# Patient Record
Sex: FEMALE | Race: WHITE | NOT HISPANIC OR LATINO | Employment: FULL TIME | URBAN - METROPOLITAN AREA
[De-identification: names, ages, dates, MRNs, and addresses within clinical notes are randomized per-mention and may not be internally consistent; named-entity substitution may affect disease eponyms.]

---

## 2017-06-26 RX ORDER — ESCITALOPRAM OXALATE 10 MG/1
TABLET ORAL
Qty: 90 TABLET | Refills: 0 | Status: SHIPPED | OUTPATIENT
Start: 2017-06-26 | End: 2017-10-03 | Stop reason: SDUPTHER

## 2017-06-26 NOTE — TELEPHONE ENCOUNTER
Attempted to contact pt regarding follow up needed. Left voicemail for pt to return call to clinic.

## 2017-06-26 NOTE — TELEPHONE ENCOUNTER
----- Message from Manju Jesus sent at 6/26/2017  2:42 PM CDT -----  Contact: self  Placed call to pod, patient miss call from your office please call back at [phone]

## 2017-06-26 NOTE — TELEPHONE ENCOUNTER
Notified pt of rx refill and follow up needed. Appointment booked. Time and date confirmed with pt.

## 2017-09-29 ENCOUNTER — PATIENT MESSAGE (OUTPATIENT)
Dept: FAMILY MEDICINE | Facility: CLINIC | Age: 47
End: 2017-09-29

## 2017-10-03 ENCOUNTER — OFFICE VISIT (OUTPATIENT)
Dept: FAMILY MEDICINE | Facility: CLINIC | Age: 47
End: 2017-10-03
Payer: COMMERCIAL

## 2017-10-03 VITALS
RESPIRATION RATE: 12 BRPM | HEIGHT: 61 IN | WEIGHT: 120.63 LBS | TEMPERATURE: 99 F | HEART RATE: 50 BPM | SYSTOLIC BLOOD PRESSURE: 100 MMHG | DIASTOLIC BLOOD PRESSURE: 60 MMHG | BODY MASS INDEX: 22.78 KG/M2

## 2017-10-03 DIAGNOSIS — F41.9 ANXIETY: Primary | ICD-10-CM

## 2017-10-03 DIAGNOSIS — F43.20 ADJUSTMENT DISORDER, UNSPECIFIED TYPE: ICD-10-CM

## 2017-10-03 PROCEDURE — 99214 OFFICE O/P EST MOD 30 MIN: CPT | Mod: S$GLB,,, | Performed by: NURSE PRACTITIONER

## 2017-10-03 RX ORDER — PROPRANOLOL HYDROCHLORIDE 80 MG/1
40 TABLET ORAL 2 TIMES DAILY
COMMUNITY
Start: 2017-10-02 | End: 2018-09-11 | Stop reason: SDUPTHER

## 2017-10-03 RX ORDER — FLUCONAZOLE 150 MG/1
TABLET ORAL
COMMUNITY
Start: 2017-09-25 | End: 2017-10-03 | Stop reason: ALTCHOICE

## 2017-10-03 RX ORDER — IBUPROFEN 800 MG/1
800 TABLET ORAL
COMMUNITY
Start: 2017-10-02 | End: 2018-06-11 | Stop reason: CLARIF

## 2017-10-03 RX ORDER — ALPRAZOLAM 1 MG/1
TABLET ORAL
Qty: 30 TABLET | Refills: 0 | Status: SHIPPED | OUTPATIENT
Start: 2017-10-03 | End: 2017-11-02 | Stop reason: SDUPTHER

## 2017-10-03 RX ORDER — CLINDAMYCIN PHOSPHATE 100 MG/5G
CREAM VAGINAL
COMMUNITY
Start: 2017-09-25 | End: 2017-10-03 | Stop reason: ALTCHOICE

## 2017-10-03 RX ORDER — ACETIC ACID AND OXYQUINOLINE SULFATE .009; .00025 G/G; G/G
JELLY VAGINAL
COMMUNITY
Start: 2017-09-12 | End: 2018-06-11 | Stop reason: CLARIF

## 2017-10-03 NOTE — PROGRESS NOTES
"Subjective:       Patient ID: Jane Carrero is a 47 y.o. female.    Chief Complaint: Anxiety    HPI Here for concerns regarding anxiety. She is currently going through a divorce after 20 years of marriage. She and her 3 children are moving into a new home. She states she is feeling overwhelmed at this time. She states most of the time she is handling things well, but will get overly anxious, panic feeling. She states when this happens she gets nervous, SOB, palpitations. She has taken xanax In the past when she was having these symptoms. She denies any prolonged use of Benzodiazepines. She does not have a record per the [employer] website for abuse. She is followed by Dr. Adams in neurology for her migraine headaches.  She denies any other concerns. See ROS.    The following portion of the patients history was reviewed and updated as appropriate: allergies, current medications, past medical and surgical history. Past social history and problem list reviewed. Family PMH and Past social history reviewed. Tobacco, Illicit drug use reviewed.     Review of Systems  Constitutional: No fatigue or fever    HENT: no sore throat or nasal congestion. No voice changes    Eyes: No vision changes, blurred vision  Skin: no rashes or lesions  Respiratory:   No SOB, Wheezing, cough  Cardiovascular:   No CP, Palpitations  Gastrointestinal:   No N/V/D. No abdominal pain, weight stable. Appetite good.   Genitourinary:   No dysuria, urgency or frequency. No change in bowels. No blood in stools.   Musculoskeletal:   No joint pain  No change in gait or coordination. .  Neurological:   No dizziness. No headaches  Hematological: No abnormal bruising or bleeding    Psychiatric/Behavioral Negative for suicidal ideas.  Denies feelings of depression. No thoughts of wanting to harm self or others. see HPI    Objective:     /60   Pulse (!) 50 Comment: manual  Temp 98.8 °F (37.1 °C) (Oral)   Resp 12   Ht 5' 1" (1.549 m)   Wt 54.7 kg (120 " lb 9.6 oz)   LMP 09/28/2017   BMI 22.79 kg/m²      Physical Exam     Constitutional: oriented to person, place, and time. well-developed and well-nourished.   HENT: nares patent. Throat clear.   Head: Normocephalic.   Eyes: Conjunctivae are normal. Pupils are equal, round, and reactive to light.   Neck: Normal range of motion. Neck supple. No tracheal deviation present. No thyromegaly present.   Cardiovascular: Normal rate, regular rhythm and normal heart sounds.    Pulmonary/Chest: Effort normal and breath sounds normal. No respiratory distress. No wheezes.   Abdominal: Soft. Bowel sounds are normal. No distension. There is no tenderness.   Musculoskeletal: Normal range of motion. Gait and coordination normal  Neurological: oriented to person, place, and time.   Skin: Skin is warm and dry. No rashes or lesions  Psychiatric: Normal mood and affect.Behavior is normal. Judgment and thought content normal. she does not present as a threat to herself or others.  Assessment:       1. Anxiety    2. Adjustment disorder, unspecified type        Plan:         Jane was seen today for anxiety.    Diagnoses and all orders for this visit:    Anxiety: will give one script for Xanax. Take only as needed. Do not take more than the prescribed amount.    Adjustment disorder, unspecified type    Other orders  -     alprazolam (XANAX) 1 MG tablet; 1/2 to 1 tablet twice daily as needed.  Do not take with ETOH, Sedatives or other narcotics. Do not take and drive due to potential for sedation. Do not take more than the prescribed amount.    Continue current medication  Take medications only as prescribed  Healthy diet, exercise  Adequate rest  Adequate hydration  Avoid allergens  Avoid excessive caffeine

## 2017-11-02 ENCOUNTER — PATIENT MESSAGE (OUTPATIENT)
Dept: FAMILY MEDICINE | Facility: CLINIC | Age: 47
End: 2017-11-02

## 2017-11-02 RX ORDER — ALPRAZOLAM 1 MG/1
TABLET ORAL
Qty: 30 TABLET | Refills: 0 | Status: CANCELLED | OUTPATIENT
Start: 2017-11-02

## 2017-11-06 NOTE — TELEPHONE ENCOUNTER
----- Message from Shannon Guerra sent at 11/6/2017  1:40 PM CST -----  Contact: [phone]  Patient is requesting a call back from the nurse, want to know was dr harris was going to write her a prescription for some medication she was asking for.  Please call the patient upon request at phone number [phone].

## 2017-11-06 NOTE — TELEPHONE ENCOUNTER
----- Message from Shannon Guerra sent at 11/6/2017  1:40 PM CST -----  Contact: [phone]  Patient is requesting a call back from the nurse, want to know was dr harris was going to write her a prescription for some medication she was asking for.  Please call the patient upon request at phone number [phone].

## 2017-11-07 RX ORDER — ALPRAZOLAM 1 MG/1
1 TABLET ORAL DAILY PRN
Qty: 30 TABLET | Refills: 0 | Status: SHIPPED | OUTPATIENT
Start: 2017-11-07 | End: 2018-01-10

## 2017-11-07 NOTE — TELEPHONE ENCOUNTER
Pt contacted regarding refill and an appointment has been schedule for future refills with Md, no further needs

## 2017-11-07 NOTE — TELEPHONE ENCOUNTER
----- Message from Graciela Gomez sent at 11/7/2017 12:32 PM CST -----  Contact: Patient  Jane, patient [phone] calling in regards to alprazolam (XANAX) 1 MG tablet. Patient is wanting a 1 month supply, she is having a lot of anxiety. Please advise. Thanks.    [employer] [id number] [place] [id number]  Phone: [phone] Fax: [phone]

## 2017-11-07 NOTE — TELEPHONE ENCOUNTER
Sorry to hear about all these stressors   I will refill this once but will need fu if refill needed  I have not seen her in over a year and Briana just did 30 tablets last month

## 2018-01-10 ENCOUNTER — TELEPHONE (OUTPATIENT)
Dept: NEUROLOGY | Facility: CLINIC | Age: 48
End: 2018-01-10

## 2018-01-10 NOTE — TELEPHONE ENCOUNTER
Called pt today in regards to referral. Left message on voicemail to call office back to schedule.

## 2018-03-01 ENCOUNTER — TELEPHONE (OUTPATIENT)
Dept: NEUROLOGY | Facility: CLINIC | Age: 48
End: 2018-03-01

## 2018-03-01 NOTE — TELEPHONE ENCOUNTER
Appointment successfully rescheduled. Called pt to reschedule. No answer, left voicemail to call back.

## 2018-03-01 NOTE — TELEPHONE ENCOUNTER
----- Message from Natalie Valentin sent at 2/28/2018  4:06 PM CST -----  Contact: self  Patient needs to cancel her appt for 03/14   Patient  will call back to reschedule

## 2018-03-13 ENCOUNTER — OFFICE VISIT (OUTPATIENT)
Dept: URGENT CARE | Facility: CLINIC | Age: 48
End: 2018-03-13
Payer: COMMERCIAL

## 2018-03-13 VITALS
DIASTOLIC BLOOD PRESSURE: 100 MMHG | BODY MASS INDEX: 22.09 KG/M2 | OXYGEN SATURATION: 99 % | HEART RATE: 62 BPM | HEIGHT: 61 IN | WEIGHT: 117 LBS | TEMPERATURE: 97 F | SYSTOLIC BLOOD PRESSURE: 142 MMHG
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
## 2018-03-13 DIAGNOSIS — R52 PAIN: Primary | ICD-10-CM

## 2018-03-13 DIAGNOSIS — M54.2 NECK PAIN: ICD-10-CM

## 2018-03-13 DIAGNOSIS — R07.89 STERNAL PAIN: ICD-10-CM

## 2018-03-13 PROCEDURE — 99203 OFFICE O/P NEW LOW 30 MIN: CPT | Mod: S$GLB,,, | Performed by: INTERNAL MEDICINE

## 2018-03-13 RX ORDER — METHYLPREDNISOLONE 4 MG/1
4 TABLET ORAL DAILY
Qty: 1 PACKAGE | Refills: 0 | Status: SHIPPED | OUTPATIENT
Start: 2018-03-13 | End: 2018-06-11 | Stop reason: CLARIF

## 2018-03-13 RX ORDER — HYDROCODONE BITARTRATE AND ACETAMINOPHEN 5; 325 MG/1; MG/1
1 TABLET ORAL EVERY 8 HOURS PRN
Qty: 20 TABLET | Refills: 0 | Status: SHIPPED | OUTPATIENT
Start: 2018-03-13 | End: 2018-06-11 | Stop reason: CLARIF

## 2018-03-13 RX ORDER — METHOCARBAMOL 500 MG/1
500 TABLET, FILM COATED ORAL 4 TIMES DAILY
Qty: 40 TABLET | Refills: 0 | Status: SHIPPED | OUTPATIENT
Start: 2018-03-13 | End: 2018-03-23
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
## 2018-06-14 PROBLEM — N93.8 DUB (DYSFUNCTIONAL UTERINE BLEEDING): Status: ACTIVE | Noted: 2018-06-14

## 2018-09-11 ENCOUNTER — OFFICE VISIT (OUTPATIENT)
Dept: PSYCHIATRY | Facility: CLINIC | Age: 48
End: 2018-09-11
Payer: COMMERCIAL

## 2018-09-11 VITALS
SYSTOLIC BLOOD PRESSURE: 124 MMHG | DIASTOLIC BLOOD PRESSURE: 68 MMHG | HEART RATE: 61 BPM | BODY MASS INDEX: 20.2 KG/M2 | HEIGHT: 61 IN | WEIGHT: 107 LBS

## 2018-09-11 DIAGNOSIS — F51.04 PSYCHOPHYSIOLOGICAL INSOMNIA: ICD-10-CM

## 2018-09-11 DIAGNOSIS — F41.1 GAD (GENERALIZED ANXIETY DISORDER): ICD-10-CM

## 2018-09-11 DIAGNOSIS — F33.1 MDD (MAJOR DEPRESSIVE DISORDER), RECURRENT EPISODE, MODERATE: Primary | ICD-10-CM

## 2018-09-11 PROCEDURE — 90792 PSYCH DIAG EVAL W/MED SRVCS: CPT | Mod: S$GLB,,, | Performed by: PSYCHIATRY & NEUROLOGY

## 2018-09-11 PROCEDURE — 99999 PR PBB SHADOW E&M-EST. PATIENT-LVL III: CPT | Mod: PBBFAC,,, | Performed by: PSYCHIATRY & NEUROLOGY

## 2018-09-11 RX ORDER — CLONAZEPAM 0.5 MG/1
0.5 TABLET ORAL EVERY 6 HOURS PRN
Refills: 3 | COMMUNITY
Start: 2018-08-27 | End: 2018-09-11 | Stop reason: ALTCHOICE

## 2018-09-11 RX ORDER — DEXTROAMPHETAMINE SACCHARATE, AMPHETAMINE ASPARTATE, DEXTROAMPHETAMINE SULFATE AND AMPHETAMINE SULFATE 5; 5; 5; 5 MG/1; MG/1; MG/1; MG/1
TABLET ORAL
Refills: 0 | COMMUNITY
Start: 2018-08-27 | End: 2018-09-11 | Stop reason: SDUPTHER

## 2018-09-11 RX ORDER — ALPRAZOLAM 1 MG/1
TABLET ORAL
Refills: 5 | COMMUNITY
Start: 2018-09-02 | End: 2019-01-15 | Stop reason: ALTCHOICE

## 2018-09-11 RX ORDER — IBUPROFEN 600 MG/1
TABLET ORAL
Refills: 2 | COMMUNITY
Start: 2018-06-11 | End: 2019-01-15

## 2018-09-11 RX ORDER — PROPRANOLOL HYDROCHLORIDE 80 MG/1
TABLET ORAL
COMMUNITY
End: 2018-09-11

## 2018-09-11 RX ORDER — ESCITALOPRAM OXALATE 20 MG/1
20 TABLET ORAL DAILY
Refills: 4 | COMMUNITY
Start: 2018-09-05 | End: 2018-12-03 | Stop reason: DRUGHIGH
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
## 2018-09-12 PROBLEM — F41.1 GAD (GENERALIZED ANXIETY DISORDER): Status: ACTIVE | Noted: 2018-09-12

## 2018-09-12 PROBLEM — F33.1 MDD (MAJOR DEPRESSIVE DISORDER), RECURRENT EPISODE, MODERATE: Status: ACTIVE | Noted: 2018-09-12

## 2018-09-20 ENCOUNTER — TELEPHONE (OUTPATIENT)
Dept: PSYCHIATRY | Facility: CLINIC | Age: 48
End: 2018-09-20

## 2018-10-29 ENCOUNTER — TELEPHONE (OUTPATIENT)
Dept: PSYCHIATRY | Facility: CLINIC | Age: 48
End: 2018-10-29

## 2018-10-29 NOTE — TELEPHONE ENCOUNTER
Patient called to cancel same day appointment  Does not feel good  Rescheduled to 12/3 at 10:30AM  Request to be added to waitlist for something sooner  Prefers Mon and Tuesdays  Does not need any refills at this time

## 2018-12-03 ENCOUNTER — OFFICE VISIT (OUTPATIENT)
Dept: PSYCHIATRY | Facility: CLINIC | Age: 48
End: 2018-12-03
Payer: COMMERCIAL

## 2018-12-03 VITALS
SYSTOLIC BLOOD PRESSURE: 102 MMHG | HEART RATE: 57 BPM | HEIGHT: 61 IN | DIASTOLIC BLOOD PRESSURE: 47 MMHG | BODY MASS INDEX: 20.11 KG/M2 | WEIGHT: 106.5 LBS

## 2018-12-03 DIAGNOSIS — F51.04 PSYCHOPHYSIOLOGICAL INSOMNIA: ICD-10-CM

## 2018-12-03 DIAGNOSIS — F33.1 MDD (MAJOR DEPRESSIVE DISORDER), RECURRENT EPISODE, MODERATE: Primary | ICD-10-CM

## 2018-12-03 DIAGNOSIS — F41.1 GAD (GENERALIZED ANXIETY DISORDER): ICD-10-CM

## 2018-12-03 PROCEDURE — 99999 PR PBB SHADOW E&M-EST. PATIENT-LVL III: CPT | Mod: PBBFAC,,, | Performed by: PSYCHIATRY & NEUROLOGY

## 2018-12-03 PROCEDURE — 99214 OFFICE O/P EST MOD 30 MIN: CPT | Mod: S$GLB,,, | Performed by: PSYCHIATRY & NEUROLOGY

## 2018-12-03 RX ORDER — FLUOXETINE 10 MG/1
10 CAPSULE ORAL DAILY
Qty: 30 CAPSULE | Refills: 0 | Status: SHIPPED | OUTPATIENT
Start: 2018-12-03 | End: 2019-01-15 | Stop reason: SDUPTHER

## 2018-12-03 RX ORDER — ESCITALOPRAM OXALATE 5 MG/1
5 TABLET ORAL DAILY
Qty: 30 TABLET | Refills: 0 | Status: SHIPPED | OUTPATIENT
Start: 2018-12-03 | End: 2019-01-15

## 2018-12-03 RX ORDER — CLONAZEPAM 0.5 MG/1
0.5 TABLET ORAL DAILY PRN
Qty: 15 TABLET | Refills: 0 | Status: SHIPPED | OUTPATIENT
Start: 2018-12-03 | End: 2019-01-15
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
## 2018-12-31 ENCOUNTER — DOCUMENTATION ONLY (OUTPATIENT)
Dept: PSYCHIATRY | Facility: CLINIC | Age: 48
End: 2018-12-31

## 2018-12-31 NOTE — PROGRESS NOTES
Patient was a no show for her second scheduled initial appt today.  She previously no showed on 11/06/2018.

## 2019-01-02 RX ORDER — ESCITALOPRAM OXALATE 5 MG/1
TABLET ORAL
Qty: 30 TABLET | Refills: 0 | OUTPATIENT
Start: 2019-01-02

## 2019-01-15 ENCOUNTER — OFFICE VISIT (OUTPATIENT)
Dept: PSYCHIATRY | Facility: CLINIC | Age: 49
End: 2019-01-15
Payer: COMMERCIAL

## 2019-01-15 VITALS
BODY MASS INDEX: 19.28 KG/M2 | WEIGHT: 102.13 LBS | HEART RATE: 68 BPM | HEIGHT: 61 IN | DIASTOLIC BLOOD PRESSURE: 81 MMHG | SYSTOLIC BLOOD PRESSURE: 144 MMHG

## 2019-01-15 DIAGNOSIS — F33.1 MDD (MAJOR DEPRESSIVE DISORDER), RECURRENT EPISODE, MODERATE: Primary | ICD-10-CM

## 2019-01-15 DIAGNOSIS — F41.1 GAD (GENERALIZED ANXIETY DISORDER): ICD-10-CM

## 2019-01-15 DIAGNOSIS — F51.04 PSYCHOPHYSIOLOGICAL INSOMNIA: ICD-10-CM

## 2019-01-15 PROCEDURE — 99214 OFFICE O/P EST MOD 30 MIN: CPT | Mod: S$GLB,,, | Performed by: PSYCHIATRY & NEUROLOGY

## 2019-01-15 PROCEDURE — 99214 PR OFFICE/OUTPT VISIT, EST, LEVL IV, 30-39 MIN: ICD-10-PCS | Mod: S$GLB,,, | Performed by: PSYCHIATRY & NEUROLOGY

## 2019-01-15 PROCEDURE — 90833 PR PSYCHOTHERAPY W/PATIENT W/E&M, 30 MIN (ADD ON): ICD-10-PCS | Mod: S$GLB,,, | Performed by: PSYCHIATRY & NEUROLOGY

## 2019-01-15 PROCEDURE — 90833 PSYTX W PT W E/M 30 MIN: CPT | Mod: S$GLB,,, | Performed by: PSYCHIATRY & NEUROLOGY

## 2019-01-15 PROCEDURE — 99999 PR PBB SHADOW E&M-EST. PATIENT-LVL III: ICD-10-PCS | Mod: PBBFAC,,, | Performed by: PSYCHIATRY & NEUROLOGY

## 2019-01-15 PROCEDURE — 99999 PR PBB SHADOW E&M-EST. PATIENT-LVL III: CPT | Mod: PBBFAC,,, | Performed by: PSYCHIATRY & NEUROLOGY

## 2019-01-15 RX ORDER — CLONAZEPAM 0.5 MG/1
0.5 TABLET ORAL DAILY PRN
Qty: 15 TABLET | Refills: 0 | Status: SHIPPED | OUTPATIENT
Start: 2019-01-15 | End: 2019-03-15 | Stop reason: ALTCHOICE

## 2019-01-15 RX ORDER — MIRTAZAPINE 7.5 MG/1
7.5 TABLET, FILM COATED ORAL NIGHTLY
Qty: 30 TABLET | Refills: 0 | Status: SHIPPED | OUTPATIENT
Start: 2019-01-15 | End: 2019-02-11 | Stop reason: ALTCHOICE

## 2019-01-15 RX ORDER — FLUOXETINE HYDROCHLORIDE 20 MG/1
20 CAPSULE ORAL DAILY
Qty: 30 CAPSULE | Refills: 0 | Status: SHIPPED | OUTPATIENT
Start: 2019-01-15 | End: 2019-02-11 | Stop reason: SDUPTHER

## 2019-01-15 RX ORDER — PROPRANOLOL HYDROCHLORIDE 40 MG/1
TABLET ORAL
COMMUNITY
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
## 2019-01-28 RX ORDER — CLONAZEPAM 0.5 MG/1
0.5 TABLET ORAL DAILY PRN
Qty: 15 TABLET | Refills: 0 | OUTPATIENT
Start: 2019-01-28 | End: 2020-01-28

## 2019-01-28 NOTE — TELEPHONE ENCOUNTER
Patient wanting to know if she could take count #2 10mg of fluoxetine. Stated has plenty from old srcipt    Fluoxetine was increased to 20 mg during last office visit 1/15/2019.    Please advice   Estela

## 2019-02-06 ENCOUNTER — TELEPHONE (OUTPATIENT)
Dept: PSYCHIATRY | Facility: CLINIC | Age: 49
End: 2019-02-06

## 2019-02-06 NOTE — TELEPHONE ENCOUNTER
Pt called clinic concerning medication. Pt states Dr. Clark discontinued her Lunesta 3 mg and started pt on Remeron 7.5 mg. Pt reports adequate response for about a week. Pt states medication is no longer helping her fall asleep or stay asleep. Pt reports adding a benzo at night for 5-6 nights to assist with sleep. Pt states she had an available refill on Lunesta 3 mg which she filled but wanted Dr. Clarks recommendations on whether to restart. Informed pt that I will send her concerns to Dr. Clark and someone will return her call. Pt verbalized understanding with no further questions.

## 2019-02-06 NOTE — TELEPHONE ENCOUNTER
""Well, the first 4 nights it worked great and it was awesome and I felt great. Then all the sudden it just quit working. So I started taking a benzo with it and that worked great, but then I went and got Lunesta but I didn't want to take something you didn't tell me I could."      With this schedule it means she has been adding benzo x 2wks.     Discuss with the pt that I do NOT want her self adjusting controlled meds. Also that I am accessible- today a good example- and she is encouraged to call when medication is not effective or needs adjustment.     Appreciate the phone call- no longer want her taking Lunesta (the rx she picked up is not from me), nor do I want her taking benzo from other providers (pt has xanax from another provider).     Encouraged her to inc remeron to 15mg po qhs and reeval. Will expect to hear from pt in next 2 days regarding results via portal.     Mult other meds we can try if this is ineffective.         "

## 2019-02-08 ENCOUNTER — PATIENT MESSAGE (OUTPATIENT)
Dept: PSYCHIATRY | Facility: CLINIC | Age: 49
End: 2019-02-08

## 2019-02-11 ENCOUNTER — OFFICE VISIT (OUTPATIENT)
Dept: PSYCHIATRY | Facility: CLINIC | Age: 49
End: 2019-02-11
Payer: COMMERCIAL

## 2019-02-11 DIAGNOSIS — F33.1 MDD (MAJOR DEPRESSIVE DISORDER), RECURRENT EPISODE, MODERATE: Primary | ICD-10-CM

## 2019-02-11 DIAGNOSIS — F41.1 GAD (GENERALIZED ANXIETY DISORDER): ICD-10-CM

## 2019-02-11 PROCEDURE — 99999 PR PBB SHADOW E&M-EST. PATIENT-LVL II: CPT | Mod: PBBFAC,,, | Performed by: SOCIAL WORKER

## 2019-02-11 PROCEDURE — 99999 PR PBB SHADOW E&M-EST. PATIENT-LVL II: ICD-10-PCS | Mod: PBBFAC,,, | Performed by: SOCIAL WORKER

## 2019-02-11 PROCEDURE — 90791 PSYCH DIAGNOSTIC EVALUATION: CPT | Mod: S$GLB,,, | Performed by: SOCIAL WORKER

## 2019-02-11 PROCEDURE — 90791 PR PSYCHIATRIC DIAGNOSTIC EVALUATION: ICD-10-PCS | Mod: S$GLB,,, | Performed by: SOCIAL WORKER

## 2019-02-11 RX ORDER — FLUOXETINE HYDROCHLORIDE 20 MG/1
CAPSULE ORAL
Qty: 30 CAPSULE | Refills: 0 | Status: SHIPPED | OUTPATIENT
Start: 2019-02-11 | End: 2019-03-10 | Stop reason: SDUPTHER

## 2019-02-11 NOTE — TELEPHONE ENCOUNTER
"Called and spoke with the pt regarding her remeron. Somehow I missed her email last week (we will look into this w/i office)    Had tremors on the inc'd remeron dose and therefore stopped it and re started the lunesta "but i'm doing so well, I haven't taken the other klonopin or xanax"-     Is exercising more frequently and engaged in therapy earlier today. Some small but significant signs of progress.     Will cont and re-discuss sxs and med adjustments at next appt 2/18      "

## 2019-02-11 NOTE — Clinical Note
Please contact the patient to schedule future appts.  She indicated that she was interested in monthly appts.

## 2019-02-13 RX ORDER — FLUOXETINE HYDROCHLORIDE 20 MG/1
CAPSULE ORAL
Qty: 30 CAPSULE | Refills: 0 | Status: SHIPPED | OUTPATIENT
Start: 2019-02-13 | End: 2019-02-25 | Stop reason: SDUPTHER

## 2019-02-13 NOTE — PROGRESS NOTES
Psychiatry Initial Visit (PhD/LCSW)  Diagnostic Interview - CPT 78259    Date: 2/11/2019    Site: [facility]    Referral source: Dr. Lanette Clark       Clinical status of patient: Outpatient    Jane Carrero, a 48 y.o. female, for initial evaluation visit.  Met with patient.    Chief complaint/reason for encounter: depression, anxiety, sleep, interpersonal and adjustment    HPI:  Patient presented on time for the initial session.  She was in a fairly good mood and apologized for missing prior appts.  Patient stated that she recently joined a gym and has been exercising three days a week.  She has started swimming and meal prepping again, so she is feeling more like herself again.  Patient stated that she has been depressed for a while due to separation from her youngest daughter, Katie (16), who is angry with her mother and wants no contact at this time.  Patient carried on an 8 year affair with a coworker and this was discovered a year ago which prompted and immediate divorce.  She was  for 20 years and has three daughters.  Juliana (23) lives in [place].  Alberta (19) is serving in the [employer] and is stationed in [place].  Katie lives with her father and his fiance nearby.  Patient understands why her daughter is angry with her and she is hopeful that their relationship can be restored in the future.  Patient briefly described her relationship with her  and prior losses/trauma from her childhood.  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  She works three days per week.  She used to not leave her couch on days that she has off, but in the last few weeks she has been feeling better and getting out more.  Patient takes medication to help her sleep, but without that, she would stay up for days.  Patient stated that her interest and motivation has improved, but it is still not how she would like it to be.  Patient has a history of panic  "attacks but she hasn't experienced any recently.  She reports that her memory and concentration is poor.  She feels considerable guilt for her actions which have effected her family very deeply.  She stated that she is interested in monthly therapy sessions.  Her therapy goal is "to feel normal, be happy and sleep at night".      Scales:  · Depression: 2  · Anxiety: 0    Review of Systems   Psychiatric/Behavioral: Positive for dysphoric mood and sleep disturbance.       Symptoms:   · Mood: depressed mood, diminished interest, insomnia, fatigue, worthlessness/guilt and poor concentration  · Anxiety: decreased memory, excessive anxiety/worry and panic attacks  · Substance abuse: denied  · Cognitive functioning: denied  · Health behaviors: noncontributory    Psychiatric history: currently under psychiatric care    Medical history:   Past Medical History:   Diagnosis Date    Anxiety     Family history of colon cancer     Hx of psychiatric care     Hypertension     Migraine headache     Mixed hyperlipidemia     Nephrolithiasis     PONV (postoperative nausea and vomiting)     S/P colonoscopy     7/14;  next due 7/19    Sleep disturbance     Tinea versicolor        Family history of psychiatric illness:   Family History   Problem Relation Age of Onset    Colon cancer Father 73    Cancer Father         bladder    Stroke Father     Hypertension Brother     Stroke Brother     Diabetes Brother     Hypertension Brother     Hypertension Brother        Social history (marriage, employment, etc.):  Social History     Socioeconomic History    Marital status:      Spouse name: Not on file    Number of children: 3    Years of education: Not on file    Highest education level: Not on file   Social Needs    Financial resource strain: Not on file    Food insecurity - worry: Not on file    Food insecurity - inability: Not on file    Transportation needs - medical: Not on file    Transportation needs - " non-medical: Not on file   Occupational History    Occupation: radiology tech   Tobacco Use    Smoking status: Former Smoker     Last attempt to quit: 3/13/2007     Years since quittin.9    Smokeless tobacco: Never Used   Substance and Sexual Activity    Alcohol use: No    Drug use: No    Sexual activity: Yes     Partners: Male   Other Topics Concern    Patient feels they ought to cut down on drinking/drug use Not Asked    Patient annoyed by others criticizing their drinking/drug use Not Asked    Patient has felt bad or guilty about drinking/drug use Not Asked    Patient has had a drink/used drugs as an eye opener in the AM Not Asked   Social History Narrative    Not on file       Current medications and drug reactions (include OTC, herbal):   Current Outpatient Medications   Medication Sig    clonazePAM (KLONOPIN) 0.5 MG tablet Take 1 tablet (0.5 mg total) by mouth daily as needed for Anxiety.    dextroamphetamine/amphetamine (ADDERALL ORAL) Take 20 mg by mouth daily as needed.    FLUoxetine 20 MG capsule TAKE ONE CAPSULE BY MOUTH ONCE DAILY    propranolol (INDERAL) 40 MG tablet propranolol 40 mg tablet   TAKE 1 TABLET TWICE A DAY     No current facility-administered medications for this visit.        Strengths and liabilities: Strength: Patient accepts guidance/feedback, Strength: Patient is expressive/articulate., Strength: Patient is intelligent., Strength: Patient is motivated for change., Strength: Patient is physically healthy., Strength: Patient has positive support network., Strength: Patient is stable., Liability: Patient lacks coping skills.    Current Evaluation:     Mental Status Exam:  General Appearance:  age appropriate, casually dressed, neatly groomed   Speech: normal tone, normal rate, normal pitch, normal volume      Level of Cooperation: cooperative      Thought Processes: normal and logical   Mood: steady      Thought Content: normal, no suicidality, no homicidality,  delusions, or paranoia   Affect: congruent and appropriate   Orientation: Oriented x3   Memory: intact   Attention Span & Concentration: intact   Fund of General Knowledge: intact and appropriate to age and level of education   Judgment & Insight: fair     Language  intact     Diagnostic Impression - Plan:       ICD-10-CM ICD-9-CM   1. MDD (major depressive disorder), recurrent episode, moderate F33.1 296.32   2. PITO (generalized anxiety disorder) F41.1 300.02       Plan:individual psychotherapy and consult psychiatrist for medication evaluation    Return to Clinic: Follow-up in about 1 month (around 3/11/2019).    Total session time: 60 minutes

## 2019-02-25 ENCOUNTER — OFFICE VISIT (OUTPATIENT)
Dept: PSYCHIATRY | Facility: CLINIC | Age: 49
End: 2019-02-25
Payer: COMMERCIAL

## 2019-02-25 VITALS
DIASTOLIC BLOOD PRESSURE: 96 MMHG | SYSTOLIC BLOOD PRESSURE: 183 MMHG | HEART RATE: 68 BPM | HEIGHT: 61 IN | BODY MASS INDEX: 20.2 KG/M2 | WEIGHT: 107 LBS

## 2019-02-25 DIAGNOSIS — F41.1 GAD (GENERALIZED ANXIETY DISORDER): ICD-10-CM

## 2019-02-25 DIAGNOSIS — F51.04 PSYCHOPHYSIOLOGICAL INSOMNIA: ICD-10-CM

## 2019-02-25 DIAGNOSIS — F33.1 MDD (MAJOR DEPRESSIVE DISORDER), RECURRENT EPISODE, MODERATE: Primary | ICD-10-CM

## 2019-02-25 PROCEDURE — 99999 PR PBB SHADOW E&M-EST. PATIENT-LVL II: ICD-10-PCS | Mod: PBBFAC,,, | Performed by: PSYCHIATRY & NEUROLOGY

## 2019-02-25 PROCEDURE — 99999 PR PBB SHADOW E&M-EST. PATIENT-LVL II: CPT | Mod: PBBFAC,,, | Performed by: PSYCHIATRY & NEUROLOGY

## 2019-02-25 PROCEDURE — 99214 PR OFFICE/OUTPT VISIT, EST, LEVL IV, 30-39 MIN: ICD-10-PCS | Mod: S$GLB,,, | Performed by: PSYCHIATRY & NEUROLOGY

## 2019-02-25 PROCEDURE — 99214 OFFICE O/P EST MOD 30 MIN: CPT | Mod: S$GLB,,, | Performed by: PSYCHIATRY & NEUROLOGY

## 2019-02-25 RX ORDER — ESZOPICLONE 3 MG/1
3 TABLET, FILM COATED ORAL NIGHTLY
Qty: 30 TABLET | Refills: 1 | Status: SHIPPED | OUTPATIENT
Start: 2019-02-25 | End: 2019-03-27

## 2019-02-25 RX ORDER — FLUOXETINE HYDROCHLORIDE 20 MG/1
20 CAPSULE ORAL DAILY
Qty: 30 CAPSULE | Refills: 1 | Status: SHIPPED | OUTPATIENT
Start: 2019-02-25 | End: 2019-03-15 | Stop reason: DRUGHIGH

## 2019-02-25 RX ORDER — CLINDAMYCIN PHOSPHATE 100 MG/5G
CREAM VAGINAL
Refills: 0 | COMMUNITY
Start: 2019-02-01

## 2019-02-25 RX ORDER — DEXTROAMPHETAMINE SACCHARATE, AMPHETAMINE ASPARTATE, DEXTROAMPHETAMINE SULFATE AND AMPHETAMINE SULFATE 2.5; 2.5; 2.5; 2.5 MG/1; MG/1; MG/1; MG/1
10 TABLET ORAL DAILY
Qty: 30 TABLET | Refills: 0 | Status: SHIPPED | OUTPATIENT
Start: 2019-02-25

## 2019-02-25 RX ORDER — FLUCONAZOLE 150 MG/1
TABLET ORAL
Refills: 0 | COMMUNITY
Start: 2019-02-21
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
## 2019-03-10 RX ORDER — FLUOXETINE HYDROCHLORIDE 20 MG/1
CAPSULE ORAL
Qty: 30 CAPSULE | Refills: 0 | Status: SHIPPED | OUTPATIENT
Start: 2019-03-10 | End: 2019-03-15 | Stop reason: DRUGHIGH

## 2019-03-15 ENCOUNTER — TELEPHONE (OUTPATIENT)
Dept: PSYCHIATRY | Facility: CLINIC | Age: 49
End: 2019-03-15

## 2019-03-15 ENCOUNTER — PATIENT MESSAGE (OUTPATIENT)
Dept: PSYCHIATRY | Facility: CLINIC | Age: 49
End: 2019-03-15

## 2019-03-15 RX ORDER — ALPRAZOLAM 0.5 MG/1
0.5 TABLET ORAL 2 TIMES DAILY PRN
Qty: 20 TABLET | Refills: 0 | Status: SHIPPED | OUTPATIENT
Start: 2019-03-15 | End: 2019-04-14

## 2019-03-15 RX ORDER — FLUOXETINE HYDROCHLORIDE 40 MG/1
40 CAPSULE ORAL DAILY
Qty: 30 CAPSULE | Refills: 0 | Status: SHIPPED | OUTPATIENT
Start: 2019-03-15

## 2019-03-15 NOTE — TELEPHONE ENCOUNTER
"Called and spoke with the pt regarding her phone call and portal message-     Pt has not taken any benzo since restarting lunesta for sleep. Will d/c klonopin from chart and provide xanax 0.5mg po daily PRN anxiety, #20 and will also increase prozac dose to 40mg today. The xanax for use today and then bridging dose while she awaits benefit from the prozac.     Pt not able to identify why sxs have escalated- "I really was doing very well and now i'm having these attacks every day for the last several days. I'm at work now but someone's coming in early for me. I just can't seem to catch my breath. That's what it feels like. i'm breathing because i'm talking to you but it's just uncomfortable."     Appreciative of call- expresses understanding of the txmt plan.       "

## 2019-03-15 NOTE — TELEPHONE ENCOUNTER
----- Message from Estela Hernandez MA sent at 3/15/2019 10:21 AM CDT -----  Contact: patient  Informed patient her previous message was forwarded to Dr. Clark.   Patient acknowledge understanding.  States she is having bad anxiety, trying to find coverage at work.  Estela    ----- Message -----  From: Angie Gauthier  Sent: 3/15/2019   9:50 AM  To: [facility] Staff    Type: Needs Medical Advice    Who Called:  pt  Best Call Back Number: [phone] (home)   Additional Information: Pt would like a call back today please she wants to speak to the nurse